# Patient Record
Sex: MALE | Race: WHITE | NOT HISPANIC OR LATINO | URBAN - METROPOLITAN AREA
[De-identification: names, ages, dates, MRNs, and addresses within clinical notes are randomized per-mention and may not be internally consistent; named-entity substitution may affect disease eponyms.]

---

## 2017-05-12 ENCOUNTER — OUTPATIENT (OUTPATIENT)
Dept: OUTPATIENT SERVICES | Facility: HOSPITAL | Age: 32
LOS: 1 days | Discharge: HOME | End: 2017-05-12

## 2017-05-12 DIAGNOSIS — E77.0 DEFECTS IN POST-TRANSLATIONAL MODIFICATION OF LYSOSOMAL ENZYMES: ICD-10-CM

## 2017-05-16 ENCOUNTER — APPOINTMENT (OUTPATIENT)
Dept: HEMATOLOGY ONCOLOGY | Facility: CLINIC | Age: 32
End: 2017-05-16

## 2017-05-16 ENCOUNTER — RESULT REVIEW (OUTPATIENT)
Age: 32
End: 2017-05-16

## 2017-05-16 VITALS
RESPIRATION RATE: 16 BRPM | OXYGEN SATURATION: 99 % | BODY MASS INDEX: 27.91 KG/M2 | HEART RATE: 65 BPM | SYSTOLIC BLOOD PRESSURE: 120 MMHG | DIASTOLIC BLOOD PRESSURE: 79 MMHG | TEMPERATURE: 98.3 F | WEIGHT: 188.49 LBS

## 2017-05-16 LAB
BASOPHILS # BLD AUTO: 0.1 K/UL — SIGNIFICANT CHANGE UP (ref 0–0.2)
BASOPHILS NFR BLD AUTO: 1 % — SIGNIFICANT CHANGE UP (ref 0–2)
EOSINOPHIL # BLD AUTO: 0.2 K/UL — SIGNIFICANT CHANGE UP (ref 0–0.5)
EOSINOPHIL NFR BLD AUTO: 2.3 % — SIGNIFICANT CHANGE UP (ref 0–6)
HCT VFR BLD CALC: 48.1 % — SIGNIFICANT CHANGE UP (ref 39–50)
HGB BLD-MCNC: 16.3 G/DL — SIGNIFICANT CHANGE UP (ref 13–17)
LYMPHOCYTES # BLD AUTO: 2.6 K/UL — SIGNIFICANT CHANGE UP (ref 1–3.3)
LYMPHOCYTES # BLD AUTO: 31.7 % — SIGNIFICANT CHANGE UP (ref 13–44)
MCHC RBC-ENTMCNC: 29.1 PG — SIGNIFICANT CHANGE UP (ref 27–34)
MCHC RBC-ENTMCNC: 33.9 G/DL — SIGNIFICANT CHANGE UP (ref 32–36)
MCV RBC AUTO: 86 FL — SIGNIFICANT CHANGE UP (ref 80–100)
MONOCYTES # BLD AUTO: 0.5 K/UL — SIGNIFICANT CHANGE UP (ref 0–0.9)
MONOCYTES NFR BLD AUTO: 5.8 % — SIGNIFICANT CHANGE UP (ref 2–14)
NEUTROPHILS # BLD AUTO: 4.8 K/UL — SIGNIFICANT CHANGE UP (ref 1.8–7.4)
NEUTROPHILS NFR BLD AUTO: 59.1 % — SIGNIFICANT CHANGE UP (ref 43–77)
PLATELET # BLD AUTO: 219 K/UL — SIGNIFICANT CHANGE UP (ref 150–400)
RBC # BLD: 5.6 M/UL — SIGNIFICANT CHANGE UP (ref 4.2–5.8)
RBC # FLD: 12.2 % — SIGNIFICANT CHANGE UP (ref 10.3–14.5)
WBC # BLD: 8.1 K/UL — SIGNIFICANT CHANGE UP (ref 3.8–10.5)
WBC # FLD AUTO: 8.1 K/UL — SIGNIFICANT CHANGE UP (ref 3.8–10.5)

## 2017-05-17 DIAGNOSIS — E75.22 GAUCHER DISEASE: ICD-10-CM

## 2017-05-17 LAB
ALBUMIN SERPL ELPH-MCNC: 4.7 G/DL
ALP BLD-CCNC: 47 U/L
ALT SERPL-CCNC: 27 U/L
ANION GAP SERPL CALC-SCNC: 14 MMOL/L
AST SERPL-CCNC: 22 U/L
BILIRUB SERPL-MCNC: 1.2 MG/DL
BUN SERPL-MCNC: 20 MG/DL
CALCIUM SERPL-MCNC: 10.3 MG/DL
CHLORIDE SERPL-SCNC: 102 MMOL/L
CO2 SERPL-SCNC: 26 MMOL/L
CREAT SERPL-MCNC: 0.9 MG/DL
FERRITIN SERPL-MCNC: 347 NG/ML
GLUCOSE SERPL-MCNC: 79 MG/DL
IRON SATN MFR SERPL: 28 %
IRON SERPL-MCNC: 81 UG/DL
POTASSIUM SERPL-SCNC: 4.8 MMOL/L
PROT SERPL-MCNC: 6.8 G/DL
SODIUM SERPL-SCNC: 142 MMOL/L
TIBC SERPL-MCNC: 291 UG/DL
UIBC SERPL-MCNC: 210 UG/DL

## 2018-06-13 ENCOUNTER — RESULT REVIEW (OUTPATIENT)
Age: 33
End: 2018-06-13

## 2018-06-13 ENCOUNTER — LABORATORY RESULT (OUTPATIENT)
Age: 33
End: 2018-06-13

## 2018-06-13 ENCOUNTER — OUTPATIENT (OUTPATIENT)
Dept: OUTPATIENT SERVICES | Facility: HOSPITAL | Age: 33
LOS: 1 days | Discharge: HOME | End: 2018-06-13

## 2018-06-13 ENCOUNTER — APPOINTMENT (OUTPATIENT)
Dept: HEMATOLOGY ONCOLOGY | Facility: CLINIC | Age: 33
End: 2018-06-13
Payer: COMMERCIAL

## 2018-06-13 VITALS
TEMPERATURE: 98.8 F | RESPIRATION RATE: 16 BRPM | SYSTOLIC BLOOD PRESSURE: 107 MMHG | DIASTOLIC BLOOD PRESSURE: 65 MMHG | WEIGHT: 189.99 LBS | BODY MASS INDEX: 28.14 KG/M2 | HEART RATE: 67 BPM | OXYGEN SATURATION: 98 %

## 2018-06-13 DIAGNOSIS — E75.22 GAUCHER DISEASE: ICD-10-CM

## 2018-06-13 DIAGNOSIS — Z87.19 PERSONAL HISTORY OF OTHER DISEASES OF THE DIGESTIVE SYSTEM: ICD-10-CM

## 2018-06-13 LAB
BASOPHILS # BLD AUTO: 0.1 K/UL — SIGNIFICANT CHANGE UP (ref 0–0.2)
BASOPHILS NFR BLD AUTO: 1 % — SIGNIFICANT CHANGE UP (ref 0–2)
EOSINOPHIL # BLD AUTO: 0.3 K/UL — SIGNIFICANT CHANGE UP (ref 0–0.5)
EOSINOPHIL NFR BLD AUTO: 4 % — SIGNIFICANT CHANGE UP (ref 0–6)
HCT VFR BLD CALC: 48.6 % — SIGNIFICANT CHANGE UP (ref 39–50)
HGB BLD-MCNC: 16.3 G/DL — SIGNIFICANT CHANGE UP (ref 13–17)
LYMPHOCYTES # BLD AUTO: 2.5 K/UL — SIGNIFICANT CHANGE UP (ref 1–3.3)
LYMPHOCYTES # BLD AUTO: 31.4 % — SIGNIFICANT CHANGE UP (ref 13–44)
MCHC RBC-ENTMCNC: 28.6 PG — SIGNIFICANT CHANGE UP (ref 27–34)
MCHC RBC-ENTMCNC: 33.5 G/DL — SIGNIFICANT CHANGE UP (ref 32–36)
MCV RBC AUTO: 85.2 FL — SIGNIFICANT CHANGE UP (ref 80–100)
MONOCYTES # BLD AUTO: 0.6 K/UL — SIGNIFICANT CHANGE UP (ref 0–0.9)
MONOCYTES NFR BLD AUTO: 7.6 % — SIGNIFICANT CHANGE UP (ref 2–14)
NEUTROPHILS # BLD AUTO: 4.5 K/UL — SIGNIFICANT CHANGE UP (ref 1.8–7.4)
NEUTROPHILS NFR BLD AUTO: 56 % — SIGNIFICANT CHANGE UP (ref 43–77)
PLATELET # BLD AUTO: 228 K/UL — SIGNIFICANT CHANGE UP (ref 150–400)
RBC # BLD: 5.7 M/UL — SIGNIFICANT CHANGE UP (ref 4.2–5.8)
RBC # FLD: 12.6 % — SIGNIFICANT CHANGE UP (ref 10.3–14.5)
WBC # BLD: 8 K/UL — SIGNIFICANT CHANGE UP (ref 3.8–10.5)
WBC # FLD AUTO: 8 K/UL — SIGNIFICANT CHANGE UP (ref 3.8–10.5)

## 2018-06-13 PROCEDURE — 99214 OFFICE O/P EST MOD 30 MIN: CPT

## 2018-06-14 LAB
ALBUMIN SERPL ELPH-MCNC: 4.7 G/DL
ALP BLD-CCNC: 52 U/L
ALT SERPL-CCNC: 38 U/L
ANION GAP SERPL CALC-SCNC: 16 MMOL/L
AST SERPL-CCNC: 23 U/L
BILIRUB SERPL-MCNC: 1.5 MG/DL
BUN SERPL-MCNC: 19 MG/DL
CALCIUM SERPL-MCNC: 9.8 MG/DL
CHLORIDE SERPL-SCNC: 103 MMOL/L
CO2 SERPL-SCNC: 25 MMOL/L
CREAT SERPL-MCNC: 1.18 MG/DL
FERRITIN SERPL-MCNC: 328 NG/ML
GLUCOSE SERPL-MCNC: 80 MG/DL
IRON SATN MFR SERPL: 36 %
IRON SERPL-MCNC: 101 UG/DL
LDH SERPL-CCNC: 173 U/L
POTASSIUM SERPL-SCNC: 4.6 MMOL/L
PROT SERPL-MCNC: 6.8 G/DL
SODIUM SERPL-SCNC: 144 MMOL/L
TIBC SERPL-MCNC: 283 UG/DL
UIBC SERPL-MCNC: 182 UG/DL

## 2018-06-22 LAB — CHITOTRIOSIDASE SERPL-CCNC: 261 NMOLES/HR/ML

## 2018-08-28 ENCOUNTER — APPOINTMENT (OUTPATIENT)
Dept: PEDIATRIC MEDICAL GENETICS | Facility: CLINIC | Age: 33
End: 2018-08-28
Payer: COMMERCIAL

## 2018-08-28 ENCOUNTER — LABORATORY RESULT (OUTPATIENT)
Age: 33
End: 2018-08-28

## 2018-08-28 DIAGNOSIS — F81.9 DEVELOPMENTAL DISORDER OF SCHOLASTIC SKILLS, UNSPECIFIED: ICD-10-CM

## 2018-08-28 DIAGNOSIS — N40.0 BENIGN PROSTATIC HYPERPLASIA WITHOUT LOWER URINARY TRACT SYMPMS: ICD-10-CM

## 2018-08-28 DIAGNOSIS — M62.89 OTHER SPECIFIED DISORDERS OF MUSCLE: ICD-10-CM

## 2018-08-28 PROCEDURE — 99245 OFF/OP CONSLTJ NEW/EST HI 55: CPT

## 2018-08-28 PROCEDURE — 36415 COLL VENOUS BLD VENIPUNCTURE: CPT

## 2018-08-30 LAB — ACE BLD-CCNC: 77 U/L

## 2018-09-05 PROBLEM — N40.0 PROSTATE HYPERTROPHY: Status: ACTIVE | Noted: 2018-09-05

## 2018-09-08 ENCOUNTER — APPOINTMENT (OUTPATIENT)
Dept: RADIOLOGY | Facility: IMAGING CENTER | Age: 33
End: 2018-09-08

## 2018-09-08 ENCOUNTER — APPOINTMENT (OUTPATIENT)
Dept: MRI IMAGING | Facility: IMAGING CENTER | Age: 33
End: 2018-09-08

## 2018-09-10 ENCOUNTER — APPOINTMENT (OUTPATIENT)
Dept: MRI IMAGING | Facility: IMAGING CENTER | Age: 33
End: 2018-09-10

## 2018-09-11 ENCOUNTER — APPOINTMENT (OUTPATIENT)
Dept: RADIOLOGY | Facility: CLINIC | Age: 33
End: 2018-09-11
Payer: COMMERCIAL

## 2018-09-11 ENCOUNTER — APPOINTMENT (OUTPATIENT)
Dept: MRI IMAGING | Facility: CLINIC | Age: 33
End: 2018-09-11
Payer: COMMERCIAL

## 2018-09-11 ENCOUNTER — OUTPATIENT (OUTPATIENT)
Dept: OUTPATIENT SERVICES | Facility: HOSPITAL | Age: 33
LOS: 1 days | End: 2018-09-11
Payer: COMMERCIAL

## 2018-09-11 DIAGNOSIS — Z00.8 ENCOUNTER FOR OTHER GENERAL EXAMINATION: ICD-10-CM

## 2018-09-11 DIAGNOSIS — E75.22 GAUCHER DISEASE: ICD-10-CM

## 2018-09-11 PROCEDURE — 72100 X-RAY EXAM L-S SPINE 2/3 VWS: CPT

## 2018-09-11 PROCEDURE — 73552 X-RAY EXAM OF FEMUR 2/>: CPT | Mod: 26,50

## 2018-09-11 PROCEDURE — 73721 MRI JNT OF LWR EXTRE W/O DYE: CPT | Mod: 26,LT,76

## 2018-09-11 PROCEDURE — 72070 X-RAY EXAM THORAC SPINE 2VWS: CPT

## 2018-09-11 PROCEDURE — 74181 MRI ABDOMEN W/O CONTRAST: CPT | Mod: 26

## 2018-09-11 PROCEDURE — 72100 X-RAY EXAM L-S SPINE 2/3 VWS: CPT | Mod: 26

## 2018-09-11 PROCEDURE — 72040 X-RAY EXAM NECK SPINE 2-3 VW: CPT | Mod: 26

## 2018-09-11 PROCEDURE — 72070 X-RAY EXAM THORAC SPINE 2VWS: CPT | Mod: 26

## 2018-09-11 PROCEDURE — 73552 X-RAY EXAM OF FEMUR 2/>: CPT

## 2018-09-11 PROCEDURE — 74181 MRI ABDOMEN W/O CONTRAST: CPT

## 2018-09-11 PROCEDURE — 73721 MRI JNT OF LWR EXTRE W/O DYE: CPT

## 2018-09-11 PROCEDURE — 72040 X-RAY EXAM NECK SPINE 2-3 VW: CPT

## 2018-09-11 PROCEDURE — 73721 MRI JNT OF LWR EXTRE W/O DYE: CPT | Mod: 26,RT

## 2019-03-08 ENCOUNTER — APPOINTMENT (OUTPATIENT)
Dept: PEDIATRIC MEDICAL GENETICS | Facility: CLINIC | Age: 34
End: 2019-03-08
Payer: COMMERCIAL

## 2019-03-08 VITALS — WEIGHT: 189.6 LBS | BODY MASS INDEX: 28.08 KG/M2 | DIASTOLIC BLOOD PRESSURE: 70 MMHG | SYSTOLIC BLOOD PRESSURE: 118 MMHG

## 2019-03-08 DIAGNOSIS — R53.83 OTHER FATIGUE: ICD-10-CM

## 2019-03-08 LAB
ALBUMIN SERPL ELPH-MCNC: 4.8 G/DL
ALP BLD-CCNC: 58 U/L
ALT SERPL-CCNC: 31 U/L
ANION GAP SERPL CALC-SCNC: 13 MMOL/L
AST SERPL-CCNC: 18 U/L
BASOPHILS # BLD AUTO: 0.05 K/UL
BASOPHILS NFR BLD AUTO: 0.6 %
BILIRUB SERPL-MCNC: 1.2 MG/DL
BUN SERPL-MCNC: 22 MG/DL
CALCIUM SERPL-MCNC: 10.2 MG/DL
CHLORIDE SERPL-SCNC: 103 MMOL/L
CK SERPL-CCNC: 148 U/L
CO2 SERPL-SCNC: 26 MMOL/L
CREAT SERPL-MCNC: 1.01 MG/DL
EOSINOPHIL # BLD AUTO: 0.19 K/UL
EOSINOPHIL NFR BLD AUTO: 2.4 %
ERYTHROCYTE [SEDIMENTATION RATE] IN BLOOD BY WESTERGREN METHOD: 4 MM/HR
GLUCOSE SERPL-MCNC: 65 MG/DL
HCT VFR BLD CALC: 51.1 %
HGB BLD-MCNC: 16.5 G/DL
IMM GRANULOCYTES NFR BLD AUTO: 0.3 %
LYMPHOCYTES # BLD AUTO: 2.45 K/UL
LYMPHOCYTES NFR BLD AUTO: 31.1 %
MAN DIFF?: NORMAL
MCHC RBC-ENTMCNC: 27.6 PG
MCHC RBC-ENTMCNC: 32.3 GM/DL
MCV RBC AUTO: 85.5 FL
MONOCYTES # BLD AUTO: 0.59 K/UL
MONOCYTES NFR BLD AUTO: 7.5 %
NEUTROPHILS # BLD AUTO: 4.59 K/UL
NEUTROPHILS NFR BLD AUTO: 58.1 %
PLATELET # BLD AUTO: 219 K/UL
POTASSIUM SERPL-SCNC: 4.3 MMOL/L
PROT SERPL-MCNC: 6.7 G/DL
RBC # BLD: 5.98 M/UL
RBC # FLD: 13.3 %
SODIUM SERPL-SCNC: 142 MMOL/L
WBC # FLD AUTO: 7.89 K/UL

## 2019-03-08 PROCEDURE — 36415 COLL VENOUS BLD VENIPUNCTURE: CPT

## 2019-03-08 PROCEDURE — 99215 OFFICE O/P EST HI 40 MIN: CPT

## 2019-03-14 LAB
MISCELLANEOUS TEST: NORMAL
PROC NAME: NORMAL

## 2019-03-15 LAB — CHITOTRIOSIDASE SERPL-CCNC: 390 NMOLES/HR/ML

## 2019-03-18 PROBLEM — R53.83 FATIGUE: Status: ACTIVE | Noted: 2018-08-28

## 2019-03-18 RX ORDER — NAPROXEN SODIUM 220 MG
220 TABLET ORAL
Qty: 1 | Refills: 0 | Status: ACTIVE | COMMUNITY
Start: 2019-03-18

## 2019-03-18 NOTE — REASON FOR VISIT
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  Genetic counselor Cecy Hernandez participated in this visit.

## 2019-03-18 NOTE — BIRTH HISTORY
[FreeTextEntry1] : MOC stated that he was hospitalized for high bilirubin for 5 days after delivery.

## 2019-03-18 NOTE — FAMILY HISTORY
[FreeTextEntry1] : Mr. Sheets is of Ashkenazi Anab[employer]ist descent. He is an only child.  His mother is in good health. His father was diagnosed with multiple sclerosis at age 26 and is now in a nursing home. His mother had 2 stillbirths, both male, both with normal autopsies done, in [place], in the 1980's. No cause was determined for either loss.

## 2019-03-18 NOTE — PHYSICAL EXAM
[Normal] : orientated to person, place, and time [Alert] : alert [Active] : active [In no acute distress] :  in no acute distress [Cranial Nerves] : cranial nerves are normal [Ankle Clonus] : no ankle clonus [Muscle tone/ strength] : muscle tone/ strength is normal [Tandem Gait] : a tandem gait no abnormalities [Romberg Sign] : Romberg sign was absent [de-identified] : growth on both ears, "bumps" on superior helix, right more than left [de-identified] : left side mole between nose and mouth [FreeTextEntry1] : Genital exam deferred. [de-identified] : slight tremor with intention, not rest, 1-2+ patellar, can't test for plantar response (too ticklish), arm reflexes OK 1-2 + [de-identified] : proximal phalanges appear swollen, yellowish color on fingers

## 2019-03-18 NOTE — REVIEW OF SYSTEMS
[Nl] : Genitourinary [NI] : Endocrine [Numbness] : numbness [Smokers in Home] : no one in home smokes [FreeTextEntry4] : dry skin on forehead and hand [FreeTextEntry1] : heavy feeling in chest [FreeTextEntry2] : headaches once every 10 days or so. Can have cluster headaches, tx with sleep and tylenol. Balance isn't great but doesn't affect daily activities, numbness if sit for a while in same position

## 2019-03-18 NOTE — HISTORY OF PRESENT ILLNESS
[FreeTextEntry1] : Update 3/8/19\par We started Mr. Sheets on a higher dose of VPRIV, increasing to 5200 Units IV every 2 weeks, (beginning on or approximately 10/1/19), in the hopes that it would help fatigue that he has been experiencing on a dose of 2400 Units SQ infusion every 2 weeks. He says that he had more energy after perhaps 4 infusions but this has plateaued. He stated that he felt that he recovered from illness faster than when on lesser dose, as he had concurrent type A influenza and bronchitis in February. He was treated with Xofluza.  He experienced bad muscle cramps at this time.  Muscle weakness has improved somewhat. His mother mentioned microdeletion of 16p11.2 as a concern, because of his history of hypotonia and learning difficulties. Same living arrangements, has had one glass of champagne only. He still falls asleep when he gets home from work and sleeps for about 3 hr.  The infusions are going well.  Less muscle spasms, vision better, headaches 1 in 10 days.  No hospitalizations or surgeries in the last 7 months.  At his last visit of 8/28/18 we sent angiotensin converting enzyme (ACE) which was 77 U/L (14-82) and tartrate-resistant acid phosphatase (TRAP) which was 12.9 IU/L (3-10).  We did not send CARY because it had been sent in June 2018.  We also sent a blood chromosome analysis because some of Mr. Sheets's history is suggestive of Klinefelter syndrome.  Result was normal 46,XY. MRI scan of abdomen for liver and spleen volumes showed liver volume of 1540 cc (normal) and spleen volume of 362 cc (upper limits of normal). MRI of femurs showed marrow infiltration.   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 \par \par Note of 8/28/18\par Mr. Sheets was hospitalized in [place] for diarrhea when he was 6 months old. Etiology was never determined. He did not have any words at age 1, and [facility] [place] area and had him start language therapy. He said his first word at 3 1/2 - 2 year of age.  He had a high fever at age 2, and his pediatrician noted a large spleen and hypotonia. He also had anemia, easy bruising and FTT. Dr. Peres at [facility] did a bone marrow biopsy and made the diagnosis of Gaucher disease. Treatment was not available until he was 7. His mother, Kylee Sheets, was concerned about his growth. She learned that there was treatment available and contacted Dr. Guajardo at [facility]. He began receiving Ceredase, getting 25 units/kg every 2 weeks until 2009, when a shortage of Cerezyme occurred. [facility] noted that he was getting weaker and had no appetite, and she had him start VPRIV, 2400 units every 2 weeks. Medical records note that his splenomegaly resolved at that time. He has never had seizures or other neurological symptoms. A note from Dr. 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 The original genetic testing was not provided.  He had another hospitalization at 8 yr for vomiting.  He describes being bullied a lot as a child.  During his teens, he had acid reflux and cluster headaches.  He required extra help in school until 10th grade, but he did go to college on his own.    \par \par Chitotriosidase (CARY) level in 2015 was 465 nmol/hr/ml (4-120), and was 261 in 2018. Tartrate- resistant acid phosphatase (TRAP) in 2015 was normal at 8.2 IU/L (3-10). Bone mineral density testing in 2016, showed a decrease since 2013 of 1.5% in his lumbar spine with T-score of -1.5, and a decrease of 10.7% in left femoral neck (T-score of -0.8).  Abdominal ultrasound from 6/2018 showed his spleen is in "the upper limits of normal", mild hepatic steatosis, mobile gallstone and a suggestion of mild atrophy of the pancreatic body. His AP of spine, also from 6/2018 showed osteopenia, but his neck and hip were normal. He is currently on 2400 units of VPRIV every 2 weeks but complains of fatigue. He works a full day, but has to rest for 3 hours after work. He is fatigued on the weekends as well. Bloodwork from 6/2018 shows CXG=700 (nl), HCT=48.6%, HGB=16.3 g/dL.  His main question is, "would increasing the dose of VPRIV help with his fatigue". He used to get his medical care from Dr. Miranda and recently had testing done there, but became frustrated with that practice, as he called multiple times for results and still hasn't heard from anyone. He has had 3 URI's in the past 6-9 months. He gets muscle spasms in his forelegs that travel up his back and into his eye sockets.  He describes weakness in his ankles, occasional bone pains, tiredness and occasional loose stools with urgency.  He has gained weight and tries to exercise by walking every day.  He denies recent hospitalizations or surgeries.

## 2019-03-25 LAB — HIGH RESOLUTION CHROMOSOMAL MICROARRAY: NORMAL

## 2019-12-04 ENCOUNTER — MEDICATION RENEWAL (OUTPATIENT)
Age: 34
End: 2019-12-04

## 2020-03-27 ENCOUNTER — APPOINTMENT (OUTPATIENT)
Dept: PEDIATRIC MEDICAL GENETICS | Facility: CLINIC | Age: 35
End: 2020-03-27

## 2020-06-05 ENCOUNTER — APPOINTMENT (OUTPATIENT)
Dept: PEDIATRIC MEDICAL GENETICS | Facility: CLINIC | Age: 35
End: 2020-06-05
Payer: COMMERCIAL

## 2020-06-05 ENCOUNTER — APPOINTMENT (OUTPATIENT)
Dept: PEDIATRIC MEDICAL GENETICS | Facility: CLINIC | Age: 35
End: 2020-06-05

## 2020-06-05 VITALS
WEIGHT: 187.38 LBS | SYSTOLIC BLOOD PRESSURE: 110 MMHG | DIASTOLIC BLOOD PRESSURE: 65 MMHG | BODY MASS INDEX: 27.75 KG/M2 | HEIGHT: 69 IN

## 2020-06-05 LAB
ALBUMIN SERPL ELPH-MCNC: 4.8 G/DL
ALP BLD-CCNC: 58 U/L
ALT SERPL-CCNC: 28 U/L
ANION GAP SERPL CALC-SCNC: 22 MMOL/L
AST SERPL-CCNC: 22 U/L
BASOPHILS # BLD AUTO: 0.06 K/UL
BASOPHILS NFR BLD AUTO: 0.7 %
BILIRUB SERPL-MCNC: 0.9 MG/DL
BUN SERPL-MCNC: 30 MG/DL
CALCIUM SERPL-MCNC: 10.1 MG/DL
CHLORIDE SERPL-SCNC: 102 MMOL/L
CO2 SERPL-SCNC: 19 MMOL/L
CREAT SERPL-MCNC: 1.04 MG/DL
EOSINOPHIL # BLD AUTO: 0.13 K/UL
EOSINOPHIL NFR BLD AUTO: 1.6 %
GLUCOSE SERPL-MCNC: 57 MG/DL
HCT VFR BLD CALC: 48.4 %
HGB BLD-MCNC: 15.9 G/DL
IMM GRANULOCYTES NFR BLD AUTO: 0.2 %
LYMPHOCYTES # BLD AUTO: 2.09 K/UL
LYMPHOCYTES NFR BLD AUTO: 25.2 %
MAN DIFF?: NORMAL
MCHC RBC-ENTMCNC: 28.2 PG
MCHC RBC-ENTMCNC: 32.9 GM/DL
MCV RBC AUTO: 85.8 FL
MONOCYTES # BLD AUTO: 0.58 K/UL
MONOCYTES NFR BLD AUTO: 7 %
NEUTROPHILS # BLD AUTO: 5.42 K/UL
NEUTROPHILS NFR BLD AUTO: 65.3 %
PLATELET # BLD AUTO: 236 K/UL
POTASSIUM SERPL-SCNC: 4 MMOL/L
PROT SERPL-MCNC: 6.8 G/DL
RBC # BLD: 5.64 M/UL
RBC # FLD: 13.1 %
SODIUM SERPL-SCNC: 143 MMOL/L
WBC # FLD AUTO: 8.3 K/UL

## 2020-06-05 PROCEDURE — 36415 COLL VENOUS BLD VENIPUNCTURE: CPT

## 2020-06-05 PROCEDURE — 99214 OFFICE O/P EST MOD 30 MIN: CPT

## 2020-06-08 LAB
ALBUMIN MFR SERPL ELPH: 69.2 %
ALBUMIN SERPL-MCNC: 4.7 G/DL
ALBUMIN/GLOB SERPL: 2.2 RATIO
ALPHA1 GLOB MFR SERPL ELPH: 3.5 %
ALPHA1 GLOB SERPL ELPH-MCNC: 0.2 G/DL
ALPHA2 GLOB MFR SERPL ELPH: 7.7 %
ALPHA2 GLOB SERPL ELPH-MCNC: 0.5 G/DL
B-GLOBULIN MFR SERPL ELPH: 9.6 %
B-GLOBULIN SERPL ELPH-MCNC: 0.7 G/DL
GAMMA GLOB FLD ELPH-MCNC: 0.7 G/DL
GAMMA GLOB MFR SERPL ELPH: 10 %
INTERPRETATION SERPL IEP-IMP: NORMAL
PROT SERPL-MCNC: 6.8 G/DL
PROT SERPL-MCNC: 6.8 G/DL

## 2020-06-11 LAB
MISCELLANEOUS TEST: NORMAL
PROC NAME: NORMAL

## 2020-06-15 LAB — CHITOTRIOSIDASE SERPL-CCNC: 403 NMOLES/HR/ML

## 2020-06-22 RX ORDER — PNV NO.95/FERROUS FUM/FOLIC AC 28MG-0.8MG
TABLET ORAL
Qty: 30 | Refills: 11 | Status: ACTIVE | COMMUNITY
Start: 2020-06-22

## 2020-06-22 NOTE — PHYSICAL EXAM
[Active] : active [Alert] : alert [In no acute distress] :  in no acute distress [Cranial Nerves] : cranial nerves are normal [Muscle tone/ strength] : muscle tone/ strength is normal [Normal] : orientated to person, place, and time [Ankle Clonus] : no ankle clonus [PERRL] : PERRL [Fine Motor Coordination] : fine motor coordination is normal [Plantar Response] : plantar response is normal [Romberg Sign] : Romberg sign was absent [de-identified] : left side mole between nose and mouth [de-identified] : no tremors noted, 1+ left patellar, 2+ right patellar, right triceps elicited, rest of upper extremity reflexes not elicited [FreeTextEntry1] : Genital exam deferred. [de-identified] : growth on both ears, "bumps" on superior helix, right more than left

## 2020-06-22 NOTE — REASON FOR VISIT
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

## 2020-06-22 NOTE — HISTORY OF PRESENT ILLNESS
[FreeTextEntry1] : Update 6/5/20\par Mr. Sheets is a 34 year old man being seen today for Gaucher disease monitoring. Since we increased his VPRIV dose to 5200 units q 2 weeks (60U/kg) he reports some decrease in fatigue. He also reports a decrease in headache frequency. His infusions have been going well, with no reported adverse events. He has not missed any infusions due to the pandemic. At his last visit of 3/8/19, his chitotriosidase level was 390 nmoles/hr/mL (up from 261 in June 2018), his glucopsychosine level was 0.138 nmol/mL, and CBC was essentially normal, with mildly elevated RBC count and HCT. His CK and ESR were normal.  We also sent a SNP microarray due to a history of speech delay, hypotonia, swelling of proximal phalanges and processing problems.  It was also normal.  He had MRI scan of the abdomen and femurs in Sept 2018 and bone density scan in June 2018.\par \par Mr. Sheets has been working from home for the past 3 months due to the COVID19 pandemic and he reports having muscle pain and cramps in his arms and some blistering of the skin on his posterior. He thinks it may be due to sitting for too long. He had joined a gym last year, but since the pandemic has not been doing much exercise. He is trying to be more physically active and did recently start walking. He reports recent weight loss which he attributes to differences in eating during the pandemic. He has noticed an improvement in his knees, numbness and tingling when sitting, and his walking speed due to the weight loss. He recently had a bleed in his left eye (5/13/20), which has resolved. From description, it sounds like a subconjunctival hemorrhage.  He has not had any major illnesses, ED visits, surgeries, or hospitalizations in the last year. 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 Sheets on a higher dose of VPRIV, increasing to 5200 Units IV every 2 weeks, (beginning on or approximately 10/1/19), in the hopes that it would help fatigue that he has been experiencing on a dose of 2400 Units SQ infusion every 2 weeks. He says that he had more energy after perhaps 4 infusions but this has plateaued. He stated that he felt that he recovered from illness faster than when on lesser dose, as he had concurrent type A influenza and bronchitis in February. He was treated with Xofluza.  He experienced bad muscle cramps at this time.  Muscle weakness has improved somewhat. His mother mentioned microdeletion of 16p11.2 as a concern, because of his history of hypotonia and learning difficulties. Same living arrangements, has had one glass of champagne only. He still falls asleep when he gets home from work and sleeps for about 3 hr.  The infusions are going well.  Less muscle spasms, vision better, headaches 1 in 10 days.  No hospitalizations or surgeries in the last 7 months.  At his last visit of 8/28/18 we sent angiotensin converting enzyme (ACE) which was 77 U/L (14-82) and tartrate-resistant acid phosphatase (TRAP) which was 12.9 IU/L (3-10).  We did not send CARY because it had been sent in June 2018.  We also sent a blood chromosome analysis because some of Mr. Sheets's history is suggestive of Klinefelter syndrome.  Result was normal 46,XY. MRI scan of abdomen for liver and spleen volumes showed liver volume of 1540 cc (normal) and spleen volume of 362 cc (upper limits of normal). MRI of femurs showed marrow infiltration.   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 \par \par Note of 8/28/18\par Mr. Sheets was hospitalized in [place] for diarrhea when he was 6 months old. Etiology was never determined. He did not have any words at age 1, and [facility] [place] the [place] area and had him start language therapy. He said his first word at 3 1/2 - 2 year of age.  He had a high fever at age 2, and his pediatrician noted a large spleen and hypotonia. He also had anemia, easy bruising and FTT. Dr. Peres at [facility] did a bone marrow biopsy and made the diagnosis of Gaucher disease. Treatment was not available until he was 7. His mother, Kylee Sheets, was concerned about his growth. She learned that there was treatment available and contacted Dr. Guajardo at [facility]. He began receiving Ceredase, getting 25 units/kg every 2 weeks until 2009, when a shortage of Cerezyme occurred. [facility] noted that he was getting weaker and had no appetite, and she had him start VPRIV, 2400 units every 2 weeks. Medical records note that his splenomegaly resolved at that time. He has never had seizures or other neurological symptoms. A note from Dr. 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 The original genetic testing was not provided.  He had another hospitalization at 8 yr for vomiting.  He describes being bullied a lot as a child.  During his teens, he had acid reflux and cluster headaches.  He required extra help in school until 10th grade, but he did go to college on his own.    \par \par Chitotriosidase (CARY) level in 2015 was 465 nmol/hr/ml (4-120), and was 261 in 2018. Tartrate- resistant acid phosphatase (TRAP) in 2015 was normal at 8.2 IU/L (3-10). Bone mineral density testing in 2016, showed a decrease since 2013 of 1.5% in his lumbar spine with T-score of -1.5, and a decrease of 10.7% in left femoral neck (T-score of -0.8).  Abdominal ultrasound from 6/2018 showed his spleen is in "the upper limits of normal", mild hepatic steatosis, mobile gallstone and a suggestion of mild atrophy of the pancreatic body. His AP of spine, also from 6/2018 showed osteopenia, but his neck and hip were normal. He is currently on 2400 units of VPRIV every 2 weeks but complains of fatigue. He works a full day, but has to rest for 3 hours after work. He is fatigued on the weekends as well. Bloodwork from 6/2018 shows PKR=453 (nl), HCT=48.6%, HGB=16.3 g/dL.  His main question is, "would increasing the dose of VPRIV help with his fatigue". He used to get his medical care from Dr. Miranda and recently had testing done there, but became frustrated with that practice, as he called multiple times for results and still hasn't heard from anyone. He has had 3 URI's in the past 6-9 months. He gets muscle spasms in his forelegs that travel up his back and into his eye sockets.  He describes weakness in his ankles, occasional bone pains, tiredness and occasional loose stools with urgency.  He has gained weight and tries to exercise by walking every day.  He denies recent hospitalizations or surgeries.

## 2020-06-22 NOTE — REVIEW OF SYSTEMS
[NI] : Endocrine [Nl] : Genitourinary [Numbness] : numbness [Smokers in Home] : no one in home smokes [FreeTextEntry4] : blistering of skin on posterior, treated with witchhazel. [FreeTextEntry1] : Hx of asthma, under control [FreeTextEntry2] : occasional headaches, but improved from last year. Numbness also improved.

## 2020-06-22 NOTE — FAMILY HISTORY
[FreeTextEntry1] : Mr. Sheets is of Ashkenazi [employer] descent. He is an only child.  His mother is in good health. His father was diagnosed with multiple sclerosis at age 26 and is now in a nursing home. His mother had 2 stillbirths, both male, both with normal autopsies done, in [place], in the 1980's. No cause was determined for either loss.

## 2020-06-23 RX ORDER — PANTOPRAZOLE 40 MG/1
40 TABLET, DELAYED RELEASE ORAL
Qty: 90 | Refills: 3 | Status: ACTIVE | COMMUNITY
Start: 2017-11-14 | End: 1900-01-01

## 2020-06-25 DIAGNOSIS — J45.909 UNSPECIFIED ASTHMA, UNCOMPLICATED: ICD-10-CM

## 2020-07-25 ENCOUNTER — APPOINTMENT (OUTPATIENT)
Dept: MRI IMAGING | Facility: IMAGING CENTER | Age: 35
End: 2020-07-25

## 2020-08-22 ENCOUNTER — APPOINTMENT (OUTPATIENT)
Dept: MRI IMAGING | Facility: IMAGING CENTER | Age: 35
End: 2020-08-22

## 2021-02-02 ENCOUNTER — NON-APPOINTMENT (OUTPATIENT)
Age: 36
End: 2021-02-02

## 2021-03-01 ENCOUNTER — FORM ENCOUNTER (OUTPATIENT)
Age: 36
End: 2021-03-01

## 2021-06-22 ENCOUNTER — APPOINTMENT (OUTPATIENT)
Dept: PEDIATRIC MEDICAL GENETICS | Facility: CLINIC | Age: 36
End: 2021-06-22
Payer: COMMERCIAL

## 2021-06-22 ENCOUNTER — APPOINTMENT (OUTPATIENT)
Dept: PEDIATRIC MEDICAL GENETICS | Facility: CLINIC | Age: 36
End: 2021-06-22

## 2021-06-22 VITALS
WEIGHT: 192 LBS | HEIGHT: 69 IN | BODY MASS INDEX: 28.44 KG/M2 | SYSTOLIC BLOOD PRESSURE: 110 MMHG | DIASTOLIC BLOOD PRESSURE: 70 MMHG

## 2021-06-22 DIAGNOSIS — M79.10 MYALGIA, UNSPECIFIED SITE: ICD-10-CM

## 2021-06-22 LAB
ALBUMIN SERPL ELPH-MCNC: 4.8 G/DL
ALP BLD-CCNC: 57 U/L
ALT SERPL-CCNC: 28 U/L
ANION GAP SERPL CALC-SCNC: 16 MMOL/L
AST SERPL-CCNC: 21 U/L
BASOPHILS # BLD AUTO: 0.05 K/UL
BASOPHILS NFR BLD AUTO: 0.6 %
BILIRUB SERPL-MCNC: 1.4 MG/DL
BUN SERPL-MCNC: 22 MG/DL
CALCIUM SERPL-MCNC: 10 MG/DL
CHLORIDE SERPL-SCNC: 102 MMOL/L
CO2 SERPL-SCNC: 21 MMOL/L
CREAT SERPL-MCNC: 1.02 MG/DL
EOSINOPHIL # BLD AUTO: 0.12 K/UL
EOSINOPHIL NFR BLD AUTO: 1.5 %
GLUCOSE SERPL-MCNC: 87 MG/DL
HCT VFR BLD CALC: 50 %
HGB BLD-MCNC: 16.8 G/DL
IMM GRANULOCYTES NFR BLD AUTO: 0.4 %
LYMPHOCYTES # BLD AUTO: 2.39 K/UL
LYMPHOCYTES NFR BLD AUTO: 30.4 %
MAN DIFF?: NORMAL
MCHC RBC-ENTMCNC: 28 PG
MCHC RBC-ENTMCNC: 33.6 GM/DL
MCV RBC AUTO: 83.3 FL
MONOCYTES # BLD AUTO: 0.58 K/UL
MONOCYTES NFR BLD AUTO: 7.4 %
NEUTROPHILS # BLD AUTO: 4.69 K/UL
NEUTROPHILS NFR BLD AUTO: 59.7 %
PLATELET # BLD AUTO: 250 K/UL
POTASSIUM SERPL-SCNC: 4.5 MMOL/L
PROT SERPL-MCNC: 6.8 G/DL
RBC # BLD: 6 M/UL
RBC # FLD: 13.4 %
SODIUM SERPL-SCNC: 139 MMOL/L
WBC # FLD AUTO: 7.86 K/UL

## 2021-06-22 PROCEDURE — 99215 OFFICE O/P EST HI 40 MIN: CPT

## 2021-06-22 PROCEDURE — 99072 ADDL SUPL MATRL&STAF TM PHE: CPT

## 2021-06-23 NOTE — REVIEW OF SYSTEMS
[Nl] : Genitourinary [NI] : Endocrine [Numbness] : numbness [Smokers in Home] : no one in home smokes [FreeTextEntry4] : Acne worse [FreeTextEntry1] : Hx of asthma, under control [FreeTextEntry2] : occasional headaches, associated with inadequate sleeep. Numbness and tingling associated with sitting.

## 2021-06-23 NOTE — HISTORY OF PRESENT ILLNESS
[FreeTextEntry1] : Update 6/5/20\par Mr. Sheets is a 34 year old man being seen today for Gaucher disease monitoring. Since we increased his VPRIV dose to 5200 units q 2 weeks (60U/kg) he reports some decrease in fatigue. He also reports a decrease in headache frequency. His infusions have been going well, with no reported adverse events. He has not missed any infusions due to the pandemic. At his last visit of 3/8/19, his chitotriosidase level was 390 nmoles/hr/mL (up from 261 in June 2018), his glucopsychosine level was 0.138 nmol/mL, and CBC was essentially normal, with mildly elevated RBC count and HCT. His CK and ESR were normal.  We also sent a SNP microarray due to a history of speech delay, hypotonia, swelling of proximal phalanges and processing problems.  It was also normal.  He had MRI scan of the abdomen and femurs in Sept 2018 and bone density scan in June 2018.\par \par Mr. Sheets has been working from home for the past 3 months due to the COVID19 pandemic and he reports having muscle pain and cramps in his arms and some blistering of the skin on his posterior. He thinks it may be due to sitting for too long. He had joined a gym last year, but since the pandemic has not been doing much exercise. He is trying to be more physically active and did recently start walking. He reports recent weight loss which he attributes to differences in eating during the pandemic. He has noticed an improvement in his knees, numbness and tingling when sitting, and his walking speed due to the weight loss. He recently had a bleed in his left eye (5/13/20), which has resolved. From description, it sounds like a subconjunctival hemorrhage.  He has not had any major illnesses, ED visits, surgeries, or hospitalizations in the last year. 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 Sheets on a higher dose of VPRIV, increasing to 5200 Units IV every 2 weeks, (beginning on or approximately 10/1/19), in the hopes that it would help fatigue that he has been experiencing on a dose of 2400 Units SQ infusion every 2 weeks. He says that he had more energy after perhaps 4 infusions but this has plateaued. He stated that he felt that he recovered from illness faster than when on lesser dose, as he had concurrent type A influenza and bronchitis in February. He was treated with Xofluza.  He experienced bad muscle cramps at this time.  Muscle weakness has improved somewhat. His mother mentioned microdeletion of 16p11.2 as a concern, because of his history of hypotonia and learning difficulties. Same living arrangements, has had one glass of champagne only. He still falls asleep when he gets home from work and sleeps for about 3 hr.  The infusions are going well.  Less muscle spasms, vision better, headaches 1 in 10 days.  No hospitalizations or surgeries in the last 7 months.  At his last visit of 8/28/18 we sent angiotensin converting enzyme (ACE) which was 77 U/L (14-82) and tartrate-resistant acid phosphatase (TRAP) which was 12.9 IU/L (3-10).  We did not send CARY because it had been sent in June 2018.  We also sent a blood chromosome analysis because some of Mr. Sheets's history is suggestive of Klinefelter syndrome.  Result was normal 46,XY. MRI scan of abdomen for liver and spleen volumes showed liver volume of 1540 cc (normal) and spleen volume of 362 cc (upper limits of normal). MRI of femurs showed marrow infiltration.   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 \par \par Note of 8/28/18\par Mr. Sheets was hospitalized in [place] for diarrhea when he was 6 months old. Etiology was never determined. He did not have any words at age 1, and [facility] moved back to the [place] area and had him start language therapy. He said his first word at 3 1/2 - 2 year of age.  He had a high fever at age 2, and his pediatrician noted a large spleen and hypotonia. He also had anemia, easy bruising and FTT. Dr. Peres at [facility] did a bone marrow biopsy and made the diagnosis of Gaucher disease. Treatment was not available until he was 7. His mother, Kylee Sheets, was concerned about his growth. She learned that there was treatment available and contacted Dr. Guajardo at [facility]. He began receiving Ceredase, getting 25 units/kg every 2 weeks until 2009, when a shortage of Cerezyme occurred. [facility] noted that he was getting weaker and had no appetite, and she had him start VPRIV, 2400 units every 2 weeks. Medical records note that his splenomegaly resolved at that time. He has never had seizures or other neurological symptoms. A note from Dr. 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 The original genetic testing was not provided.  He had another hospitalization at 8 yr for vomiting.  He describes being bullied a lot as a child.  During his teens, he had acid reflux and cluster headaches.  He required extra help in school until 10th grade, but he did go to college on his own.    \par \par Chitotriosidase (CARY) level in 2015 was 465 nmol/hr/ml (4-120), and was 261 in 2018. Tartrate- resistant acid phosphatase (TRAP) in 2015 was normal at 8.2 IU/L (3-10). Bone mineral density testing in 2016, showed a decrease since 2013 of 1.5% in his lumbar spine with T-score of -1.5, and a decrease of 10.7% in left femoral neck (T-score of -0.8).  Abdominal ultrasound from 6/2018 showed his spleen is in "the upper limits of normal", mild hepatic steatosis, mobile gallstone and a suggestion of mild atrophy of the pancreatic body. His AP of spine, also from 6/2018 showed osteopenia, but his neck and hip were normal. He is currently on 2400 units of VPRIV every 2 weeks but complains of fatigue. He works a full day, but has to rest for 3 hours after work. He is fatigued on the weekends as well. Bloodwork from 6/2018 shows OCL=183 (nl), HCT=48.6%, HGB=16.3 g/dL.  His main question is, "would increasing the dose of VPRIV help with his fatigue". He used to get his medical care from Dr. Miranda and recently had testing done there, but became frustrated with that practice, as he called multiple times for results and still hasn't heard from anyone. He has had 3 URI's in the past 6-9 months. He gets muscle spasms in his forelegs that travel up his back and into his eye sockets.  He describes weakness in his ankles, occasional bone pains, tiredness and occasional loose stools with urgency.  He has gained weight and tries to exercise by walking every day.  He denies recent hospitalizations or surgeries.

## 2021-06-23 NOTE — PHYSICAL EXAM
[Alert] : alert [Active] : active [No acute distress] : no acute distress [Extraocular Movements Intact] : extraocular movements were intact [Positive red reflex bilaterally] : positive red reflex bilaterally [Cranial Nerves] : cranial nerves are normal [Muscle tone/ strength] : muscle tone/ strength is normal [Fine Motor Coordination] : fine motor coordination is normal [DTR] : deep tendon reflexes are normal [Gait Normal] : gait normal [Plantar Response] : plantar response is normal [No Ankle Clonus] : no ankle clonus [Tandem Gait Normal] : tandem gait normal [PERRL] : PERRL [Normal shape and position] : normal shape and position [Normal] : without joint laxity or contractures [Tremor] : no tremor [Romberg Sign] : Romberg sign was absent [de-identified] : Height is 175 cm.  Weight is 87.1 kg.  [de-identified] : Genital exam deferred. [de-identified] : Some limitation in extension of fingers bl. Wrists and elbows wnl bilaterally. [de-identified] : 1+ patellar reflexes, normal plantar reflex, but very ticklish, DTR 2+ throughout, Able to walk on toes, on heels.

## 2021-06-23 NOTE — FAMILY HISTORY
[FreeTextEntry1] : Mr. Sheets is of [employer]kenazi Sh[employer] descent. He is an only child.  His mother is in good health. His father was diagnosed with multiple sclerosis at age 26 and is now in a nursing home. His mother had 2 stillbirths, both male, both with normal autopsies done, in [place], in the 1980's. No cause was determined for either loss.

## 2021-06-29 LAB
ALBUMIN MFR SERPL ELPH: 69.2 %
ALBUMIN SERPL-MCNC: 4.7 G/DL
ALBUMIN/GLOB SERPL: 2.2 RATIO
ALPHA1 GLOB MFR SERPL ELPH: 3.6 %
ALPHA1 GLOB SERPL ELPH-MCNC: 0.2 G/DL
ALPHA2 GLOB MFR SERPL ELPH: 8 %
ALPHA2 GLOB SERPL ELPH-MCNC: 0.5 G/DL
B-GLOBULIN MFR SERPL ELPH: 9.2 %
B-GLOBULIN SERPL ELPH-MCNC: 0.6 G/DL
GAMMA GLOB FLD ELPH-MCNC: 0.7 G/DL
GAMMA GLOB MFR SERPL ELPH: 10 %
INTERPRETATION SERPL IEP-IMP: NORMAL
PROT SERPL-MCNC: 6.8 G/DL
PROT SERPL-MCNC: 6.8 G/DL

## 2021-07-01 LAB
MISCELLANEOUS TEST: NORMAL
PROC NAME: NORMAL

## 2021-07-06 LAB — CHITOTRIOSIDASE SERPL-CCNC: 292 NMOLES/HR/ML

## 2021-07-07 ENCOUNTER — TRANSCRIPTION ENCOUNTER (OUTPATIENT)
Age: 36
End: 2021-07-07

## 2021-08-02 RX ORDER — FINASTERIDE 1 MG/1
1 TABLET ORAL DAILY
Qty: 90 | Refills: 3 | Status: ACTIVE | COMMUNITY
Start: 2018-09-05 | End: 1900-01-01

## 2021-12-31 ENCOUNTER — FORM ENCOUNTER (OUTPATIENT)
Age: 36
End: 2021-12-31

## 2022-01-18 ENCOUNTER — FORM ENCOUNTER (OUTPATIENT)
Age: 37
End: 2022-01-18

## 2022-01-25 ENCOUNTER — NON-APPOINTMENT (OUTPATIENT)
Age: 37
End: 2022-01-25

## 2022-03-09 ENCOUNTER — NON-APPOINTMENT (OUTPATIENT)
Age: 37
End: 2022-03-09

## 2022-03-13 ENCOUNTER — FORM ENCOUNTER (OUTPATIENT)
Age: 37
End: 2022-03-13

## 2022-06-06 ENCOUNTER — APPOINTMENT (OUTPATIENT)
Dept: PEDIATRIC MEDICAL GENETICS | Facility: CLINIC | Age: 37
End: 2022-06-06
Payer: COMMERCIAL

## 2022-06-06 ENCOUNTER — APPOINTMENT (OUTPATIENT)
Dept: PEDIATRIC MEDICAL GENETICS | Facility: CLINIC | Age: 37
End: 2022-06-06

## 2022-06-06 LAB
ALBUMIN SERPL ELPH-MCNC: 4.8 G/DL
ALP BLD-CCNC: 70 U/L
ALT SERPL-CCNC: 21 U/L
ANION GAP SERPL CALC-SCNC: 12 MMOL/L
AST SERPL-CCNC: 17 U/L
BASOPHILS # BLD AUTO: 0.05 K/UL
BASOPHILS NFR BLD AUTO: 0.7 %
BILIRUB SERPL-MCNC: 1 MG/DL
BUN SERPL-MCNC: 25 MG/DL
CALCIUM SERPL-MCNC: 10.5 MG/DL
CHLORIDE SERPL-SCNC: 104 MMOL/L
CO2 SERPL-SCNC: 26 MMOL/L
CREAT SERPL-MCNC: 0.98 MG/DL
EGFR: 101 ML/MIN/1.73M2
EOSINOPHIL # BLD AUTO: 0.17 K/UL
EOSINOPHIL NFR BLD AUTO: 2.4 %
GLUCOSE SERPL-MCNC: 84 MG/DL
HCT VFR BLD CALC: 48.9 %
HGB BLD-MCNC: 16.3 G/DL
IMM GRANULOCYTES NFR BLD AUTO: 0.3 %
LYMPHOCYTES # BLD AUTO: 2.28 K/UL
LYMPHOCYTES NFR BLD AUTO: 32 %
MAN DIFF?: NORMAL
MCHC RBC-ENTMCNC: 28.4 PG
MCHC RBC-ENTMCNC: 33.3 GM/DL
MCV RBC AUTO: 85.3 FL
MONOCYTES # BLD AUTO: 0.48 K/UL
MONOCYTES NFR BLD AUTO: 6.7 %
NEUTROPHILS # BLD AUTO: 4.12 K/UL
NEUTROPHILS NFR BLD AUTO: 57.9 %
PLATELET # BLD AUTO: 254 K/UL
POTASSIUM SERPL-SCNC: 4.6 MMOL/L
PROT SERPL-MCNC: 6.9 G/DL
RBC # BLD: 5.73 M/UL
RBC # FLD: 13.6 %
SODIUM SERPL-SCNC: 142 MMOL/L
WBC # FLD AUTO: 7.12 K/UL

## 2022-06-06 PROCEDURE — 99215 OFFICE O/P EST HI 40 MIN: CPT

## 2022-06-08 LAB
ALBUMIN MFR SERPL ELPH: 68.7 %
ALBUMIN SERPL-MCNC: 4.7 G/DL
ALBUMIN/GLOB SERPL: 2.1 RATIO
ALPHA1 GLOB MFR SERPL ELPH: 3.6 %
ALPHA1 GLOB SERPL ELPH-MCNC: 0.2 G/DL
ALPHA2 GLOB MFR SERPL ELPH: 8.1 %
ALPHA2 GLOB SERPL ELPH-MCNC: 0.6 G/DL
B-GLOBULIN MFR SERPL ELPH: 9.2 %
B-GLOBULIN SERPL ELPH-MCNC: 0.6 G/DL
GAMMA GLOB FLD ELPH-MCNC: 0.7 G/DL
GAMMA GLOB MFR SERPL ELPH: 10.4 %
INTERPRETATION SERPL IEP-IMP: NORMAL
PROT SERPL-MCNC: 6.9 G/DL
PROT SERPL-MCNC: 6.9 G/DL

## 2022-06-09 ENCOUNTER — APPOINTMENT (OUTPATIENT)
Dept: PEDIATRIC MEDICAL GENETICS | Facility: CLINIC | Age: 37
End: 2022-06-09

## 2022-06-10 LAB
MISCELLANEOUS TEST: NORMAL
PROC NAME: NORMAL

## 2022-06-11 ENCOUNTER — APPOINTMENT (OUTPATIENT)
Dept: RADIOLOGY | Facility: CLINIC | Age: 37
End: 2022-06-11
Payer: COMMERCIAL

## 2022-06-11 ENCOUNTER — OUTPATIENT (OUTPATIENT)
Dept: OUTPATIENT SERVICES | Facility: HOSPITAL | Age: 37
LOS: 1 days | End: 2022-06-11
Payer: COMMERCIAL

## 2022-06-11 DIAGNOSIS — E75.22 GAUCHER DISEASE: ICD-10-CM

## 2022-06-11 PROCEDURE — 73552 X-RAY EXAM OF FEMUR 2/>: CPT | Mod: 26,50

## 2022-06-11 PROCEDURE — 73552 X-RAY EXAM OF FEMUR 2/>: CPT

## 2022-06-11 PROCEDURE — 77085 DXA BONE DENSITY AXL VRT FX: CPT

## 2022-06-11 PROCEDURE — 77085 DXA BONE DENSITY AXL VRT FX: CPT | Mod: 26

## 2022-06-20 NOTE — PHYSICAL EXAM
[Extraocular Movements Intact] : extraocular movements were intact [PERRL] : PERRL [Normal shape and position] : normal shape and position [Scleral Abnormality] : no scleral abnormality [Normal Bowel Sounds] : normal bowel sounds [Abdominal Distension] : no abdominal distension [Normal] : without joint laxity or contractures [Muscle tone/ strength] : muscle tone/ strength is normal [Gait Normal] : gait normal [Moving all four extremities symmetrically] : moving all four extremities symmetrically

## 2022-06-20 NOTE — DATA REVIEWED
[FreeTextEntry1] : We reviewed previous Gaucher disease labs, MRIs and previous Medical Genetics consult notes.

## 2022-06-20 NOTE — HISTORY OF PRESENT ILLNESS
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 He was started on treatment at age 7 with Ceredase, then Cerezyme 25 U/kg every 2 weeks. He switched to VPRIV during the Cerezyme shortage. He remained on a low dose of ERT until he established care with Dr. Sanchez in 2018. At that time, it was noted that he was receiving only 27.6 U/kg of VPRIV and was experiencing several findings likely to be related to Gaucher disease, including osteopenia, fatigue and irritable bowel symptoms. Imaging was done and he was found to havemildly enlarged spleen, mild Erlenmeyer flask deformities, and some infiltration with Gaucher cells in the femurs. Dr. Sanchez increased his dose to 60 U/kg every 2 weeks for a total dose of 5200 U. He reported decrease in fatigue and headache frequency after starting on the higher dose. \par \par Tima is still receiving VPRIV every 2 weeks at a dose of 60 U/kg (total of 5200 U). He has not had any adverse events or infusion related reactions. He reports increase in fatigue in the past 6 months, not correlated to infusion timing. He does have a new job, which requires commuting into the city. He reports that the fatigue is not as severe as it was before he was put on the higher dose of VPRIV.  Tima had been having chronic diarrhea since March 2021 subsequent to food poisoning. He was put on doxycycline when he had Covid-19 and the diarrhea has since resolved. He has not had bone pain, bone breaks or fractures. He has allergies and has a history of asthma. He denies easy bruising or excessive bleeding. \par \par He had Covid-19 in May 2022 and had mild symptoms.

## 2022-06-20 NOTE — END OF VISIT
[FreeTextEntry3] : I personally collected the history, performed the exam, and formulated the discussion and plan as reported in chart by\narayan Quintero MS, CGC [Time Spent: ___ minutes] : I have spent [unfilled] minutes of time on the encounter.

## 2022-07-05 LAB — CHITOTRIOSIDASE SERPL-CCNC: 420 NMOLES/HR/ML

## 2022-07-09 ENCOUNTER — APPOINTMENT (OUTPATIENT)
Dept: RADIOLOGY | Facility: CLINIC | Age: 37
End: 2022-07-09

## 2022-07-09 ENCOUNTER — OUTPATIENT (OUTPATIENT)
Dept: OUTPATIENT SERVICES | Facility: HOSPITAL | Age: 37
LOS: 1 days | End: 2022-07-09
Payer: COMMERCIAL

## 2022-07-09 DIAGNOSIS — Z00.8 ENCOUNTER FOR OTHER GENERAL EXAMINATION: ICD-10-CM

## 2022-07-09 PROCEDURE — 72070 X-RAY EXAM THORAC SPINE 2VWS: CPT

## 2022-07-09 PROCEDURE — 72100 X-RAY EXAM L-S SPINE 2/3 VWS: CPT | Mod: 26

## 2022-07-09 PROCEDURE — 72040 X-RAY EXAM NECK SPINE 2-3 VW: CPT | Mod: 26

## 2022-07-09 PROCEDURE — 72100 X-RAY EXAM L-S SPINE 2/3 VWS: CPT

## 2022-07-09 PROCEDURE — 72040 X-RAY EXAM NECK SPINE 2-3 VW: CPT

## 2022-07-09 PROCEDURE — 72070 X-RAY EXAM THORAC SPINE 2VWS: CPT | Mod: 26

## 2022-07-11 ENCOUNTER — NON-APPOINTMENT (OUTPATIENT)
Age: 37
End: 2022-07-11

## 2022-08-07 ENCOUNTER — FORM ENCOUNTER (OUTPATIENT)
Age: 37
End: 2022-08-07

## 2022-11-07 ENCOUNTER — NON-APPOINTMENT (OUTPATIENT)
Age: 37
End: 2022-11-07

## 2023-01-04 ENCOUNTER — NON-APPOINTMENT (OUTPATIENT)
Age: 38
End: 2023-01-04

## 2023-06-15 ENCOUNTER — APPOINTMENT (OUTPATIENT)
Dept: PEDIATRIC MEDICAL GENETICS | Facility: CLINIC | Age: 38
End: 2023-06-15

## 2023-06-15 ENCOUNTER — APPOINTMENT (OUTPATIENT)
Dept: PEDIATRIC MEDICAL GENETICS | Facility: CLINIC | Age: 38
End: 2023-06-15
Payer: COMMERCIAL

## 2023-06-15 VITALS — HEIGHT: 68.98 IN

## 2023-06-15 VITALS — HEART RATE: 65 BPM | SYSTOLIC BLOOD PRESSURE: 137 MMHG | OXYGEN SATURATION: 98 % | DIASTOLIC BLOOD PRESSURE: 77 MMHG

## 2023-06-15 PROCEDURE — 99214 OFFICE O/P EST MOD 30 MIN: CPT

## 2023-06-16 LAB
ALBUMIN SERPL ELPH-MCNC: 4.9 G/DL
ALP BLD-CCNC: 68 U/L
ALT SERPL-CCNC: 19 U/L
ANION GAP SERPL CALC-SCNC: 14 MMOL/L
AST SERPL-CCNC: 17 U/L
BASOPHILS # BLD AUTO: 0.05 K/UL
BASOPHILS NFR BLD AUTO: 0.7 %
BILIRUB SERPL-MCNC: 1.5 MG/DL
BUN SERPL-MCNC: 21 MG/DL
CALCIUM SERPL-MCNC: 10.1 MG/DL
CHLORIDE SERPL-SCNC: 105 MMOL/L
CO2 SERPL-SCNC: 26 MMOL/L
CREAT SERPL-MCNC: 1.07 MG/DL
EGFR: 91 ML/MIN/1.73M2
EOSINOPHIL # BLD AUTO: 0.19 K/UL
EOSINOPHIL NFR BLD AUTO: 2.6 %
GLUCOSE SERPL-MCNC: 79 MG/DL
HCT VFR BLD CALC: 50.1 %
HGB BLD-MCNC: 15.9 G/DL
IMM GRANULOCYTES NFR BLD AUTO: 0.3 %
LYMPHOCYTES # BLD AUTO: 2.17 K/UL
LYMPHOCYTES NFR BLD AUTO: 29.4 %
MAN DIFF?: NORMAL
MCHC RBC-ENTMCNC: 27.9 PG
MCHC RBC-ENTMCNC: 31.7 GM/DL
MCV RBC AUTO: 88 FL
MONOCYTES # BLD AUTO: 0.48 K/UL
MONOCYTES NFR BLD AUTO: 6.5 %
NEUTROPHILS # BLD AUTO: 4.46 K/UL
NEUTROPHILS NFR BLD AUTO: 60.5 %
PLATELET # BLD AUTO: 225 K/UL
POTASSIUM SERPL-SCNC: 4.1 MMOL/L
PROT SERPL-MCNC: 6.7 G/DL
RBC # BLD: 5.69 M/UL
RBC # FLD: 13.4 %
SODIUM SERPL-SCNC: 145 MMOL/L
WBC # FLD AUTO: 7.37 K/UL

## 2023-06-23 LAB
ALBUMIN MFR SERPL ELPH: 68.4 %
ALBUMIN SERPL-MCNC: 4.6 G/DL
ALBUMIN/GLOB SERPL: 2.2 RATIO
ALPHA1 GLOB MFR SERPL ELPH: 3.7 %
ALPHA1 GLOB SERPL ELPH-MCNC: 0.2 G/DL
ALPHA2 GLOB MFR SERPL ELPH: 7.8 %
ALPHA2 GLOB SERPL ELPH-MCNC: 0.5 G/DL
B-GLOBULIN MFR SERPL ELPH: 9.8 %
B-GLOBULIN SERPL ELPH-MCNC: 0.7 G/DL
CHITOTRIOSIDASE SERPL-CCNC: 328 NMOLES/HR/ML
GAMMA GLOB FLD ELPH-MCNC: 0.7 G/DL
GAMMA GLOB MFR SERPL ELPH: 10.3 %
INTERPRETATION SERPL IEP-IMP: NORMAL
MISCELLANEOUS TEST: NORMAL
PROT SERPL-MCNC: 6.7 G/DL
PROT SERPL-MCNC: 6.7 G/DL

## 2023-07-07 NOTE — HISTORY OF PRESENT ILLNESS
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 He was started on treatment at age 7 with Ceredase, then Cerezyme 25 U/kg every 2 weeks. He switched to VPRIV during the Cerezyme shortage. He remained on a low dose of ERT until he established care with Dr. Sanchez in 2018. At that time, it was noted that he was receiving only 27.6 U/kg of VPRIV and was experiencing several findings likely to be related to Gaucher disease, including osteopenia, fatigue and irritable bowel symptoms. Imaging was done and he was found to have mildly enlarged spleen, mild Erlenmeyer flask deformities, and some infiltration with Gaucher cells in the femurs. Dr. Sanchez increased his dose to 60 U/kg every 2 weeks for a total dose of 5200 U. He reported decrease in fatigue and headache frequency after starting on the higher dose. \par \par Tima was last seen in June 2022. He is still receiving VPRIV every 2 weeks at a dose of 60 U/kg (total of 5200 U), with no premedications. His infusion duration is 80-90 minutes. He has not had any adverse events or infusion related reactions. He reports some fatigue. He has not had bone pain, bone breaks or fractures, abdominal pain. He denies easy bruising or excessive bleeding. He reports "indigestion" for which he takes Protonix on occasion.has allergies and has a history of asthma. \par \narayan Tima had a DEXA scan in June 2022 which showed normal bone density. MRI of the abdomen done February 2022 showed an estimated liver volume of 1475 cc and estimated splenic volume of 615 cc.  Femur and Spine X-rays were done in 06/2022 which did not show any avascular necrosis.  His labs were all essentially wnl. His chitotriosidase is mildly elevated at 420 nmoles/hr/mL and glucopsychosine is mildly elevated at 0.157 nmole/mL.\par \par Tima has not had any surgeries, hospitalizations, ED visits, or major illnesses in the past year. \par \par

## 2023-07-07 NOTE — PHYSICAL EXAM
[Normal] : no rash, no stigmata of neurocutaneous disease [de-identified] : left calf muscle tightness with flexion of the foot

## 2023-07-07 NOTE — DATA REVIEWED
[FreeTextEntry1] : MRI abdomen 6/2022- reviewed\par liver volume- normal dimensions- 1475cc (previous volume 1540 in 2018)\par splenomegaly present- volume 615cc (previous spleen volume in 2018, 362 cc)\par

## 2023-07-07 NOTE — REASON FOR VISIT
[Follow-Up] : [unfilled]  is being seen for  ~M a follow-up visit [FreeTextEntry3] : follow up for Gaucher

## 2023-07-26 ENCOUNTER — NON-APPOINTMENT (OUTPATIENT)
Age: 38
End: 2023-07-26

## 2024-06-05 ENCOUNTER — APPOINTMENT (OUTPATIENT)
Dept: PEDIATRIC MEDICAL GENETICS | Facility: CLINIC | Age: 39
End: 2024-06-05
Payer: COMMERCIAL

## 2024-06-05 VITALS
WEIGHT: 189.02 LBS | BODY MASS INDEX: 27.93 KG/M2 | RESPIRATION RATE: 100 BRPM | DIASTOLIC BLOOD PRESSURE: 74 MMHG | SYSTOLIC BLOOD PRESSURE: 111 MMHG | HEART RATE: 70 BPM

## 2024-06-05 DIAGNOSIS — E75.22 GAUCHER DISEASE: ICD-10-CM

## 2024-06-05 PROCEDURE — 99215 OFFICE O/P EST HI 40 MIN: CPT

## 2024-06-05 PROCEDURE — G2211 COMPLEX E/M VISIT ADD ON: CPT

## 2024-06-05 NOTE — PHYSICAL EXAM
[Normal] : no rash, no stigmata of neurocutaneous disease [Muscle tone/ strength] : muscle tone/ strength is normal [Gait Normal] : gait normal

## 2024-06-06 LAB
25(OH)D3 SERPL-MCNC: 36 NG/ML
ALBUMIN MFR SERPL ELPH: 69.4 %
ALBUMIN SERPL ELPH-MCNC: 4.7 G/DL
ALBUMIN SERPL-MCNC: 4.6 G/DL
ALBUMIN/GLOB SERPL: 2.2 RATIO
ALP BLD-CCNC: 61 U/L
ALPHA1 GLOB MFR SERPL ELPH: 3.4 %
ALPHA1 GLOB SERPL ELPH-MCNC: 0.2 G/DL
ALPHA2 GLOB MFR SERPL ELPH: 7.8 %
ALPHA2 GLOB SERPL ELPH-MCNC: 0.5 G/DL
ALT SERPL-CCNC: 20 U/L
ANION GAP SERPL CALC-SCNC: 11 MMOL/L
AST SERPL-CCNC: 16 U/L
B-GLOBULIN MFR SERPL ELPH: 9.3 %
B-GLOBULIN SERPL ELPH-MCNC: 0.6 G/DL
BILIRUB SERPL-MCNC: 1.8 MG/DL
BUN SERPL-MCNC: 17 MG/DL
CALCIUM SERPL-MCNC: 9.7 MG/DL
CHLORIDE SERPL-SCNC: 106 MMOL/L
CO2 SERPL-SCNC: 25 MMOL/L
CREAT SERPL-MCNC: 1.19 MG/DL
EGFR: 79 ML/MIN/1.73M2
GAMMA GLOB FLD ELPH-MCNC: 0.7 G/DL
GAMMA GLOB MFR SERPL ELPH: 10.1 %
GLUCOSE SERPL-MCNC: 91 MG/DL
HCT VFR BLD CALC: 49 %
HGB BLD-MCNC: 15.6 G/DL
INTERPRETATION SERPL IEP-IMP: NORMAL
MCHC RBC-ENTMCNC: 27.4 PG
MCHC RBC-ENTMCNC: 31.8 GM/DL
MCV RBC AUTO: 86 FL
PLATELET # BLD AUTO: 245 K/UL
POTASSIUM SERPL-SCNC: 4.1 MMOL/L
PROT SERPL-MCNC: 6.5 G/DL
PROT SERPL-MCNC: 6.7 G/DL
PROT SERPL-MCNC: 6.7 G/DL
RBC # BLD: 5.7 M/UL
RBC # FLD: 13.4 %
SODIUM SERPL-SCNC: 142 MMOL/L
WBC # FLD AUTO: 7.51 K/UL

## 2024-06-06 NOTE — FAMILY HISTORY
[FreeTextEntry1] : He has no children and no siblings. He lives at home with his mother and his father has passed away.

## 2024-06-06 NOTE — HISTORY OF PRESENT ILLNESS
[FreeTextEntry1] : Diagnosis: Gaucher disease, type 1 Genotype: [id number] Last Visit: 6-2023 Treatment: VPRIV 60 U/kg (total of 5200 U)  Diagnosis History: disease by bone marrow biopsy at ~2 years of age when he presented with anemia, easy bruising, and splenomegaly. 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 He switched to VPRIV during the Cerezyme shortage. Currently on VPRIV 60 U/kg (total of 5200 U)  Interim Hx: Tima was last seen in June 2023. He is still receiving VPRIV every 2 weeks at a dose of 60 U/kg (total of 5200 U), with no premedications. He has not had any adverse events or infusion related reactions. He reports some fatigue. He has not had bone pain, bone breaks or fractures, abdominal pain. He denies easy bruising or excessive bleeding. He reports "indigestion" for which he takes Protonix on occasion. He complains of stiffness in his upper back muscles and some nerve pain there as well, likely stress related. He was noted to have gallstones that are stable and do not cause any pain or discomfort. He reports new sensitivities to foods such as gluten. he exercises with a physical therapist and is an  for small business loans.   Tima had a DEXA scan in June 2022 which showed normal bone density. MRI of the abdomen done February 2022 showed an estimated liver volume of 1475 cc and estimated splenic volume of 615 cc. splenomegaly present- volume 615cc (previous spleen volume in 2018, 362 cc). Femur and Spine X-rays were done in 06/2022 which did not show any avascular necrosis. He has a hx of mild Erlenmeyer flask deformities, and some infiltration with Gaucher cells in the femurs. His labs were all essentially wnl. His chitotriosidase is mildly elevated at 328 nmoles/hr/mL and glucopsychosine is mildly elevated at 0.157 nmole/mL.  Tima has not had any surgeries, hospitalizations, ED visits, or major illnesses in the past year.

## 2024-06-06 NOTE — REASON FOR VISIT
[Initial - Scheduled] : [unfilled]  is being seen for  ~M an initial scheduled visit [FreeTextEntry3] : Gaucher disease f/u

## 2024-06-11 LAB
CLINICAL BIOCHEMIST REVIEW: NORMAL
GLUCOPSYCHOSINE [MOLES/VOLUME] IN SERUM OR PLASMA: 0.18 NMOL/ML
PROVIDER SIGNING NAME: NORMAL

## 2024-06-12 LAB — CHITOTRIOSIDASE SERPL-CCNC: 359 NMOLES/HR/ML

## 2025-06-18 ENCOUNTER — APPOINTMENT (OUTPATIENT)
Dept: PEDIATRIC MEDICAL GENETICS | Facility: CLINIC | Age: 40
End: 2025-06-18
Payer: COMMERCIAL

## 2025-06-18 VITALS — HEIGHT: 68.98 IN | WEIGHT: 193.98 LBS | BODY MASS INDEX: 28.73 KG/M2

## 2025-06-18 LAB
ALBUMIN SERPL ELPH-MCNC: 4.4 G/DL
ALP BLD-CCNC: 74 U/L
ALT SERPL-CCNC: 41 U/L
ANION GAP SERPL CALC-SCNC: 17 MMOL/L
AST SERPL-CCNC: 33 U/L
BILIRUB SERPL-MCNC: 1.1 MG/DL
BUN SERPL-MCNC: 22 MG/DL
CALCIUM SERPL-MCNC: 9.8 MG/DL
CHLORIDE SERPL-SCNC: 104 MMOL/L
CO2 SERPL-SCNC: 21 MMOL/L
CREAT SERPL-MCNC: 1.02 MG/DL
EGFRCR SERPLBLD CKD-EPI 2021: 95 ML/MIN/1.73M2
GLUCOSE SERPL-MCNC: 108 MG/DL
HCT VFR BLD CALC: 50.1 %
HGB BLD-MCNC: 16.4 G/DL
MCHC RBC-ENTMCNC: 28.1 PG
MCHC RBC-ENTMCNC: 32.7 G/DL
MCV RBC AUTO: 85.8 FL
PLATELET # BLD AUTO: 237 K/UL
POTASSIUM SERPL-SCNC: 4.2 MMOL/L
PROT SERPL-MCNC: 6.5 G/DL
RBC # BLD: 5.84 M/UL
RBC # FLD: 13.3 %
SODIUM SERPL-SCNC: 142 MMOL/L
WBC # FLD AUTO: 7.44 K/UL

## 2025-06-18 PROCEDURE — 99215 OFFICE O/P EST HI 40 MIN: CPT

## 2025-06-19 LAB
ALBUMIN MFR SERPL ELPH: 68.9 %
ALBUMIN SERPL-MCNC: 4.5 G/DL
ALBUMIN/GLOB SERPL: 2.2 RATIO
ALPHA1 GLOB MFR SERPL ELPH: 3.6 %
ALPHA1 GLOB SERPL ELPH-MCNC: 0.2 G/DL
ALPHA2 GLOB MFR SERPL ELPH: 7.7 %
ALPHA2 GLOB SERPL ELPH-MCNC: 0.5 G/DL
B-GLOBULIN MFR SERPL ELPH: 9.6 %
B-GLOBULIN SERPL ELPH-MCNC: 0.6 G/DL
GAMMA GLOB FLD ELPH-MCNC: 0.7 G/DL
GAMMA GLOB MFR SERPL ELPH: 10.2 %
INTERPRETATION SERPL IEP-IMP: NORMAL
PROT SERPL-MCNC: 6.5 G/DL
PROT SERPL-MCNC: 6.5 G/DL

## 2025-06-25 ENCOUNTER — NON-APPOINTMENT (OUTPATIENT)
Age: 40
End: 2025-06-25

## 2025-06-25 LAB
CLINICAL BIOCHEMIST REVIEW: NORMAL
GLUCOPSYCHOSINE [MOLES/VOLUME] IN SERUM OR PLASMA: 0.14 NMOL/ML
PROVIDER SIGNING NAME: NORMAL

## 2025-06-27 LAB — CHITOTRIOSIDASE SERPL-CCNC: 396 NMOLES/HR/ML

## 2025-07-29 ENCOUNTER — NON-APPOINTMENT (OUTPATIENT)
Age: 40
End: 2025-07-29

## 2025-08-08 ENCOUNTER — NON-APPOINTMENT (OUTPATIENT)
Age: 40
End: 2025-08-08

## 2025-09-06 ENCOUNTER — RESULT REVIEW (OUTPATIENT)
Age: 40
End: 2025-09-06

## 2025-09-06 ENCOUNTER — APPOINTMENT (OUTPATIENT)
Dept: MRI IMAGING | Facility: CLINIC | Age: 40
End: 2025-09-06

## 2025-09-06 ENCOUNTER — OUTPATIENT (OUTPATIENT)
Dept: OUTPATIENT SERVICES | Facility: HOSPITAL | Age: 40
LOS: 1 days | End: 2025-09-06
Payer: COMMERCIAL

## 2025-09-06 DIAGNOSIS — E75.22 GAUCHER DISEASE: ICD-10-CM

## 2025-09-06 PROCEDURE — 73718 MRI LOWER EXTREMITY W/O DYE: CPT | Mod: 26,LT,76

## 2025-09-06 PROCEDURE — 73718 MRI LOWER EXTREMITY W/O DYE: CPT

## 2025-09-06 PROCEDURE — 74181 MRI ABDOMEN W/O CONTRAST: CPT

## 2025-09-06 PROCEDURE — 73718 MRI LOWER EXTREMITY W/O DYE: CPT | Mod: 26,RT

## 2025-09-06 PROCEDURE — 74181 MRI ABDOMEN W/O CONTRAST: CPT | Mod: 26
